# Patient Record
Sex: FEMALE | HISPANIC OR LATINO | Employment: FULL TIME | URBAN - METROPOLITAN AREA
[De-identification: names, ages, dates, MRNs, and addresses within clinical notes are randomized per-mention and may not be internally consistent; named-entity substitution may affect disease eponyms.]

---

## 2023-10-05 ENCOUNTER — OFFICE VISIT (OUTPATIENT)
Dept: PRIMARY CARE | Facility: CLINIC | Age: 73
End: 2023-10-05
Payer: MEDICARE

## 2023-10-05 VITALS
BODY MASS INDEX: 23.87 KG/M2 | SYSTOLIC BLOOD PRESSURE: 132 MMHG | DIASTOLIC BLOOD PRESSURE: 80 MMHG | WEIGHT: 139.8 LBS | OXYGEN SATURATION: 95 % | HEART RATE: 63 BPM | HEIGHT: 64 IN

## 2023-10-05 DIAGNOSIS — Z79.899 HIGH RISK MEDICATION USE: ICD-10-CM

## 2023-10-05 DIAGNOSIS — F41.9 ANXIETY: Primary | ICD-10-CM

## 2023-10-05 PROCEDURE — 80373 DRUG SCREENING TRAMADOL: CPT

## 2023-10-05 PROCEDURE — 80307 DRUG TEST PRSMV CHEM ANLYZR: CPT

## 2023-10-05 PROCEDURE — 1159F MED LIST DOCD IN RCRD: CPT | Performed by: FAMILY MEDICINE

## 2023-10-05 PROCEDURE — 99213 OFFICE O/P EST LOW 20 MIN: CPT | Performed by: FAMILY MEDICINE

## 2023-10-05 PROCEDURE — G0009 ADMIN PNEUMOCOCCAL VACCINE: HCPCS | Performed by: FAMILY MEDICINE

## 2023-10-05 PROCEDURE — 80368 SEDATIVE HYPNOTICS: CPT

## 2023-10-05 PROCEDURE — 90677 PCV20 VACCINE IM: CPT | Performed by: FAMILY MEDICINE

## 2023-10-05 PROCEDURE — 82570 ASSAY OF URINE CREATININE: CPT

## 2023-10-05 PROCEDURE — 80361 OPIATES 1 OR MORE: CPT

## 2023-10-05 PROCEDURE — 1036F TOBACCO NON-USER: CPT | Performed by: FAMILY MEDICINE

## 2023-10-05 PROCEDURE — 80346 BENZODIAZEPINES1-12: CPT

## 2023-10-05 PROCEDURE — 80354 DRUG SCREENING FENTANYL: CPT

## 2023-10-05 PROCEDURE — 80358 DRUG SCREENING METHADONE: CPT

## 2023-10-05 PROCEDURE — 80365 DRUG SCREENING OXYCODONE: CPT

## 2023-10-05 PROCEDURE — 1126F AMNT PAIN NOTED NONE PRSNT: CPT | Performed by: FAMILY MEDICINE

## 2023-10-05 RX ORDER — LEVOTHYROXINE SODIUM 50 UG/1
50 TABLET ORAL
COMMUNITY

## 2023-10-05 RX ORDER — ALPRAZOLAM 0.5 MG/1
TABLET ORAL
COMMUNITY
Start: 2008-07-21 | End: 2023-10-05 | Stop reason: SDUPTHER

## 2023-10-05 RX ORDER — ACETAMINOPHEN 500 MG
5000 TABLET ORAL
COMMUNITY

## 2023-10-05 RX ORDER — ALPRAZOLAM 0.5 MG/1
0.5 TABLET ORAL 3 TIMES DAILY PRN
Qty: 30 TABLET | Refills: 0 | Status: SHIPPED | OUTPATIENT
Start: 2023-10-05

## 2023-10-05 RX ORDER — PROPRANOLOL HYDROCHLORIDE 40 MG/1
1 TABLET ORAL 2 TIMES DAILY
COMMUNITY
Start: 2022-09-28

## 2023-10-05 ASSESSMENT — ENCOUNTER SYMPTOMS: DEPRESSION: 0

## 2023-10-05 ASSESSMENT — PAIN SCALES - GENERAL: PAINLEVEL: 0-NO PAIN

## 2023-10-05 NOTE — PROGRESS NOTES
"Subjective   Patient ID: Angelika Carty is a 73 y.o. female who presents for Follow-up (Medication refills ).    HPI   Patient here for medication refill.  She will be doing some traveling and needs some Xanax for flights.  She has used this safely in the past.  I have personally reviewed the [place]AR[place] report for patient. I have considered the risks of abuse, dependence, addiction and diversion.   Last URINE DRUG SCREEN DATE : Today  Controlled Substance Agreement:   I have printed this form and reviewed each line item with the patient and the patient has verbalized understanding.   Date of the last CONTROLLED SUBSTANCE AGREEMENT: Today  Is relief being achieved with current treatment? yes.  Activities of Daily Living:   Yes, it is my opinion that this patient is benefitting from therapy .   Tolerating medication.  Using the medication has helped diminish previous symptoms.      Review of Systems    Objective   /80 (BP Location: Left arm, Patient Position: Sitting, BP Cuff Size: Adult)   Pulse 63   Ht 1.626 m (5' 4\")   Wt 63.4 kg (139 lb 12.8 oz)   SpO2 95%   BMI 24.00 kg/m²     Physical Exam  Alert, pleasant and in no acute distress.  Neck: Supple, no JVD or carotid bruit  Heart: Regular rate and rhythm, no murmur  Lungs: Clear to auscultation  Lower extremities: No edema  \  Assessment/Plan   Problem List Items Addressed This Visit    None  Visit Diagnoses         Codes    Anxiety    -  Primary F41.9    High risk medication use     Z79.899    Relevant Medications    ALPRAZolam (Xanax) 0.5 mg tablet    Other Relevant Orders    Opiate/Opioid/Benzo Prescription Compliance    OOB Internal Tracking        Patient here for anxiety.  OARRS reviewed, UDS and CSA obtained.  Refill for Xanax provided.  Safety and use of medication was discussed.  Discussed the importance of vaccinations.  Patient plans to get these in the next month but does not want them today.       "

## 2023-10-06 LAB
AMPHETAMINES UR QL SCN: NORMAL
BARBITURATES UR QL SCN: NORMAL
BZE UR QL SCN: NORMAL
CANNABINOIDS UR QL SCN: NORMAL
CREAT UR-MCNC: 101.6 MG/DL (ref 20–320)
PCP UR QL SCN: NORMAL

## 2024-11-21 ENCOUNTER — APPOINTMENT (OUTPATIENT)
Dept: PRIMARY CARE | Facility: CLINIC | Age: 74
End: 2024-11-21
Payer: MEDICARE

## 2024-11-21 ENCOUNTER — LAB (OUTPATIENT)
Dept: LAB | Facility: LAB | Age: 74
End: 2024-11-21
Payer: MEDICARE

## 2024-11-21 VITALS — BODY MASS INDEX: 24.72 KG/M2 | OXYGEN SATURATION: 96 % | HEIGHT: 64 IN | WEIGHT: 144.8 LBS | HEART RATE: 67 BPM

## 2024-11-21 DIAGNOSIS — Z79.899 HIGH RISK MEDICATION USE: ICD-10-CM

## 2024-11-21 DIAGNOSIS — F41.9 ANXIETY: ICD-10-CM

## 2024-11-21 DIAGNOSIS — E11.9 DM TYPE 2, NOT AT GOAL: ICD-10-CM

## 2024-11-21 DIAGNOSIS — I10 BENIGN ESSENTIAL HYPERTENSION: ICD-10-CM

## 2024-11-21 DIAGNOSIS — F41.9 ANXIETY: Primary | ICD-10-CM

## 2024-11-21 LAB
AMPHETAMINES UR QL SCN: NORMAL
BARBITURATES UR QL SCN: NORMAL
BZE UR QL SCN: NORMAL
CANNABINOIDS UR QL SCN: NORMAL
CREAT UR-MCNC: 65.2 MG/DL (ref 20–320)
PCP UR QL SCN: NORMAL

## 2024-11-21 PROCEDURE — 99214 OFFICE O/P EST MOD 30 MIN: CPT | Performed by: FAMILY MEDICINE

## 2024-11-21 PROCEDURE — 80346 BENZODIAZEPINES1-12: CPT

## 2024-11-21 PROCEDURE — 80354 DRUG SCREENING FENTANYL: CPT

## 2024-11-21 PROCEDURE — 1126F AMNT PAIN NOTED NONE PRSNT: CPT | Performed by: FAMILY MEDICINE

## 2024-11-21 PROCEDURE — 80373 DRUG SCREENING TRAMADOL: CPT

## 2024-11-21 PROCEDURE — 80365 DRUG SCREENING OXYCODONE: CPT

## 2024-11-21 PROCEDURE — 1159F MED LIST DOCD IN RCRD: CPT | Performed by: FAMILY MEDICINE

## 2024-11-21 PROCEDURE — 1036F TOBACCO NON-USER: CPT | Performed by: FAMILY MEDICINE

## 2024-11-21 PROCEDURE — 80361 OPIATES 1 OR MORE: CPT

## 2024-11-21 PROCEDURE — 80368 SEDATIVE HYPNOTICS: CPT

## 2024-11-21 PROCEDURE — 80358 DRUG SCREENING METHADONE: CPT

## 2024-11-21 PROCEDURE — 80307 DRUG TEST PRSMV CHEM ANLYZR: CPT

## 2024-11-21 PROCEDURE — 82570 ASSAY OF URINE CREATININE: CPT

## 2024-11-21 PROCEDURE — 3008F BODY MASS INDEX DOCD: CPT | Performed by: FAMILY MEDICINE

## 2024-11-21 RX ORDER — BLOOD-GLUCOSE SENSOR
EACH MISCELLANEOUS
Qty: 5 EACH | Refills: 10 | Status: SHIPPED | OUTPATIENT
Start: 2024-11-21 | End: 2024-11-21

## 2024-11-21 RX ORDER — ALPRAZOLAM 0.5 MG/1
0.5 TABLET ORAL 3 TIMES DAILY PRN
Qty: 30 TABLET | Refills: 0 | Status: SHIPPED | OUTPATIENT
Start: 2024-11-21

## 2024-11-21 RX ORDER — FLUTICASONE PROPIONATE 50 MCG
1 SPRAY, SUSPENSION (ML) NASAL DAILY
COMMUNITY
Start: 2024-08-23

## 2024-11-21 RX ORDER — METOPROLOL SUCCINATE 25 MG/1
25 TABLET, EXTENDED RELEASE ORAL DAILY
Qty: 30 TABLET | Refills: 5 | Status: SHIPPED | OUTPATIENT
Start: 2024-11-21 | End: 2025-05-20

## 2024-11-21 RX ORDER — BLOOD-GLUCOSE,RECEIVER,CONT
EACH MISCELLANEOUS
Qty: 1 EACH | Refills: 0 | Status: SHIPPED | OUTPATIENT
Start: 2024-11-21 | End: 2024-11-21

## 2024-11-21 RX ORDER — BLOOD SUGAR DIAGNOSTIC
STRIP MISCELLANEOUS
COMMUNITY
Start: 2024-06-29

## 2024-11-21 ASSESSMENT — PAIN SCALES - GENERAL: PAINLEVEL_OUTOF10: 0-NO PAIN

## 2024-11-21 ASSESSMENT — ENCOUNTER SYMPTOMS: DEPRESSION: 0

## 2024-11-21 NOTE — PROGRESS NOTES
"Subjective   Patient ID: Angelika Carty is a 74 y.o. female who presents for Med Refill (Needs refill for alprazolam.).    HPI   Patient here for follow-up on Xanax.  She uses this occasionally.  Last prescription over a year ago.  She does have regular anxiety.  Her blood pressure is quite labile.  She takes propranolol as needed for anxiety.  Home blood pressures have been 102 140 systolic.  She has a history of being quite sensitive to medications.  Patient also has a history of a positive CT cardiac score and very elevated LDL cholesterol.  She has tried multiple statins without success as she gets side effects.  She is considering PSK 9 inhibitor but is hesitant as she does not like to take medication.  Patient has type 2 diabetes.  A1c's have been in the sevens.  She has worked hard at weight loss and better diet although admits to eating a lot of sweets due to recent holloween season.  She is checking her sugars regularly.  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    I have personally reviewed the OARRS report for patient. I have considered the risks of abuse, dependence, addiction and diversion.   Last URINE DRUG SCREEN DATE : Today  Controlled Substance Agreement:   I have printed this form and reviewed each line item with the patient and the patient has verbalized understanding.   Date of the last CONTROLLED SUBSTANCE AGREEMENT: N/A due to short-term prescription  Is relief being achieved with current treatment? yes.  Activities of Daily Living:   Yes, it is my opinion that this patient is benefitting from therapy .   Tolerating medication.  Using the medication has helped diminish previous symptoms.        Review of Systems    Objective   Pulse 67   Ht 1.626 m (5' 4\")   Wt 65.7 kg (144 lb 12.8 oz)   SpO2 96%   BMI 24.85 kg/m²     Physical Exam  Alert, pleasant and in no acute distress.  Neck: Supple, no JVD or carotid " bruit  Heart: Regular rate and rhythm, no murmur  Lungs: Clear to auscultation  Lower extremities: No edema    Assessment/Plan   Problem List Items Addressed This Visit    None  Visit Diagnoses         Codes    Anxiety    -  Primary F41.9    Relevant Medications    metoprolol succinate XL (Toprol-XL) 25 mg 24 hr tablet    Other Relevant Orders    Opiate/Opioid/Benzo Prescription Compliance    Benign essential hypertension     I10    Relevant Medications    metoprolol succinate XL (Toprol-XL) 25 mg 24 hr tablet    DM type 2, not at goal     E11.9    Relevant Medications    blood-glucose sensor (Dexcom G7 Sensor) device    Dexcom G7  misc    High risk medication use     Z79.899    Relevant Medications    ALPRAZolam (Xanax) 0.5 mg tablet    Other Relevant Orders    Opiate/Opioid/Benzo Prescription Compliance        Patient here for refill on Xanax.  Prescription provided.  Patient with labile blood pressures.  I think with her mild CAD on the cardiac score and her history of a septal defect repair and noted thickening of her myocardium on echo, optimal control would be ideal.  We are going to try a low-dose of beta-blocker to see if she can tolerate this.  If not, we may need to approach her blood pressure by treating her anxiety with something like escitalopram.  Diabetes type 2: Patient does not want an A1c today.  She would like to follow-up in January to have this checked at that time.  Encouraged her to get away from the sweets.  Follow-up in 2 to 3 months.

## 2024-11-22 DIAGNOSIS — E11.9 DM TYPE 2, NOT AT GOAL: ICD-10-CM

## 2024-11-22 DIAGNOSIS — I10 BENIGN ESSENTIAL HYPERTENSION: Primary | ICD-10-CM

## 2024-11-22 RX ORDER — BLOOD-GLUCOSE SENSOR
EACH MISCELLANEOUS
Qty: 5 EACH | Refills: 5 | Status: SHIPPED | OUTPATIENT
Start: 2024-11-22

## 2024-11-22 RX ORDER — BLOOD-GLUCOSE,RECEIVER,CONT
EACH MISCELLANEOUS
Qty: 1 EACH | Refills: 0 | Status: SHIPPED | OUTPATIENT
Start: 2024-11-22 | End: 2024-11-22

## 2024-11-22 RX ORDER — BLOOD-GLUCOSE,RECEIVER,CONT
EACH MISCELLANEOUS
Qty: 1 EACH | Refills: 0 | Status: SHIPPED | OUTPATIENT
Start: 2024-11-22
